# Patient Record
(demographics unavailable — no encounter records)

---

## 2024-10-18 NOTE — PROCEDURE
[No] : not [CRT-P] : Cardiac resynchronization therapy pacemaker [DDD] : DDD [Normal] : The battery status is normal. [Lead Imp:  ___ohms] : lead impedance was [unfilled] ohms [Sensing Amplitude ___mv] : sensing amplitude was [unfilled] mv [___V @] : [unfilled] V [___ ms] : [unfilled] ms [de-identified] : [facility] [de-identified] : U128 [de-identified] : [id number] [de-identified] : 02/24/2023 [de-identified] : 60 [de-identified] : 9.5 years [de-identified] : RV and LV outputs decreased [de-identified] : Multiple episodes of atrial flutter, longest episode >39 hours 9/11/24

## 2024-10-18 NOTE — END OF VISIT
[Time Spent: ___ minutes] : I have spent [unfilled] minutes of time on the encounter which excludes teaching and separately reported services. [FreeTextEntry3] :  All medical record entries made by my scribe were at my, Dr. Tung Galan, direction and personally dictated by me. I have reviewed the chart and agree that the record accurately reflects my personal performance of the history, physical exam, assessment and plan. I have also personally directed, reviewed, and agreed with the chart.

## 2024-10-18 NOTE — HISTORY OF PRESENT ILLNESS
[de-identified] : Cardio: Dr. Barraza EP: Dr. Galan  60 yo male with PMH prostate CA, GERD, HTN, atrial fib s/p cardioversion 2/1/23 on Xarelto s/p CRT-P placement for CHB on EKG.  Patient comes for further evaluation for atrial fibrillation. Patient complains of some weakness and tiredness.   Patient s/p AF ablation in 11/2023. Patient feels great, no palpitations. Device evaluation showed no atrial fibrillation since ablation.    04/19/2024: Patient is doing great. No signs of recurrence of atrial fibrillation since ablation. Device did not show any signs of atrial fibrillation.     10/18/2024: Patient comes to the office today for routine follow-up. He was found to have an increased AF burden. Last ablation was in 2023.       EKG (12/21/2023): Sinus rhythm

## 2024-10-18 NOTE — ADDENDUM
[FreeTextEntry1] : Lynn HARRSION assisted in documentation on 10/18/2024   acting as a scribe for Dr. Tung Galan.

## 2024-10-18 NOTE — CARDIOLOGY SUMMARY
[de-identified] : 3/23/2023: sinus rhythm, BIV-paced 72 bpm [de-identified] : 2/24/2023:  1. Mildly decreased global left ventricular systolic function. Left  ventricular ejection fraction, by visual estimation, is 45 to 50%.  2. The left ventricular diastolic function could not be assessed in this  study.  3. Normal right ventricular size and function.  4. Moderately enlarged left atrium.  5. Sclerotic aortic valve with normal opening.  6. Dilatation of the ascending aorta (4.0cm).  7. No evidence of left ventricular thrombus with echocontrast  administration.  8. No pericardial effusion.  9. No evidence of pulmonary hypertension. 10. NB: patient was not in sinus rhythm during this study; likely CHB.

## 2024-10-18 NOTE — ASSESSMENT
[FreeTextEntry1] : Atrial Fibrillation   - Continue Xarelto 20 mg once a day no bleeding.    - Decrease Metoprolol to 25 mg once a day.    - I discussed with patient the possibility of second ablation. Patient has some symptoms. Patient would like to think about it. Will return to the office in 01/2025 for further discussion about second ablation.    Complete Heart Block  - Normal functioning BiV-P pacemaker.     Hypertension  - Patient's pressure is well controlled.   - Continue Lisinopril 20 mg once a day.

## 2024-10-18 NOTE — PHYSICAL EXAM
[General Appearance - Well Developed] : well developed [Normal Appearance] : normal appearance [Well Groomed] : well groomed [General Appearance - Well Nourished] : well nourished [No Deformities] : no deformities [General Appearance - In No Acute Distress] : no acute distress [Heart Rate And Rhythm] : heart rate and rhythm were normal [Heart Sounds] : normal S1 and S2 [] : no respiratory distress [Respiration, Rhythm And Depth] : normal respiratory rhythm and effort [Left Infraclavicular] : left infraclavicular area [Clean] : clean [Dry] : dry [Well-Healed] : well-healed [Abdomen Soft] : soft [Nail Clubbing] : no clubbing of the fingernails [Cyanosis, Localized] : no localized cyanosis

## 2025-01-31 NOTE — ASSESSMENT
[FreeTextEntry1] : Atrial Fibrillation   - Continue Xarelto 20 mg once a day no bleeding.    - Decrease Metoprolol to 25 mg once a day.    - Patient showed increase in frequency and duration of his atrial fibrillation. Patient is in AF~55% of the time. - Discussed with patient different options. Patient would like to proceed with ablation.   - I have discussed different treatment options with THOE KEVIN  including anti-arrhythmic medications or ablation.  After a long discussion, the patient would like to proceed with an ablation.  I have explained the risks and benefits of the procedure to the patient.  There is approximately 1-2% chance of any major cardiovascular complication to occur. Complications include, but are not limited to infection, bleeding, damage to the vessels, hole in the heart, stroke, death and heart attack. There is also 1% risk of phrenic nerve injury.  The patient understands the risk and would like to proceed with the procedure. Patient had opportunity to ask questions. Patient indicated that all of his questions were answered to his satisfaction and verbalized understanding.  Complete Heart Block  - Normal functioning BiV-P pacemaker.    Hypertension  - Patient's pressure is well controlled.   - Continue Lisinopril 20 mg once a day.

## 2025-01-31 NOTE — CARDIOLOGY SUMMARY
[de-identified] : 3/23/2023: sinus rhythm, BIV-paced 72 bpm [de-identified] : 2/24/2023:  1. Mildly decreased global left ventricular systolic function. Left  ventricular ejection fraction, by visual estimation, is 45 to 50%.  2. The left ventricular diastolic function could not be assessed in this  study.  3. Normal right ventricular size and function.  4. Moderately enlarged left atrium.  5. Sclerotic aortic valve with normal opening.  6. Dilatation of the ascending aorta (4.0cm).  7. No evidence of left ventricular thrombus with echocontrast  administration.  8. No pericardial effusion.  9. No evidence of pulmonary hypertension. 10. NB: patient was not in sinus rhythm during this study; likely CHB.

## 2025-01-31 NOTE — PROCEDURE
[No] : not [CRT-P] : Cardiac resynchronization therapy pacemaker [DDD] : DDD [Normal] : The battery status is normal. [Lead Imp:  ___ohms] : lead impedance was [unfilled] ohms [Sensing Amplitude ___mv] : sensing amplitude was [unfilled] mv [___V @] : [unfilled] V [___ ms] : [unfilled] ms [None] : none [Magnet Rate: ___ Ppm] : magnet rate was [unfilled] Ppm [de-identified] : [facility] [de-identified] : U128 [de-identified] : [id number] [de-identified] : 02/24/2023 [de-identified] : 60 [de-identified] : 9.5 years [de-identified] : AP 7% BP 90% Multiple episodes of atrial flutter, longest episode >48 hours 12/17/24

## 2025-01-31 NOTE — HISTORY OF PRESENT ILLNESS
[de-identified] : Cardio: Dr. Barraza EP: Dr. Galan  62 yo male with PMH prostate CA, GERD, HTN, atrial fib s/p cardioversion 2/1/23 on Xarelto s/p CRT-P placement for CHB on EKG.  Patient comes for further evaluation for atrial fibrillation. Patient complains of some weakness and tiredness.   Patient s/p AF ablation in 11/2023. Patient feels great, no palpitations. Device evaluation showed no atrial fibrillation since ablation.    04/19/2024: Patient is doing great. No signs of recurrence of atrial fibrillation since ablation. Device did not show any signs of atrial fibrillation.     10/18/2024: Patient comes to the office today for routine follow-up. He was found to have an increased AF burden. Last ablation was in 2023.   01/31/2025: Patient comes to the office for routine follow-up of his pacemaker and to discuss possibility of ablation.       EKG (12/21/2023): Sinus rhythm

## 2025-01-31 NOTE — ADDENDUM
[FreeTextEntry1] : Lynn HARRISON assisted in documentation on 01/31/2025   acting as a scribe for Dr. Tung Galan.

## 2025-06-27 NOTE — PHYSICAL EXAM
[Normal Appearance] : normal appearance [General Appearance - Well Developed] : well developed [Well Groomed] : well groomed [General Appearance - Well Nourished] : well nourished [No Deformities] : no deformities [General Appearance - In No Acute Distress] : no acute distress [Heart Rate And Rhythm] : heart rate and rhythm were normal [Heart Sounds] : normal S1 and S2 [] : no respiratory distress [Respiration, Rhythm And Depth] : normal respiratory rhythm and effort [Left Infraclavicular] : left infraclavicular area [Clean] : clean [Dry] : dry [Well-Healed] : well-healed [Abdomen Soft] : soft [Nail Clubbing] : no clubbing of the fingernails [Cyanosis, Localized] : no localized cyanosis

## 2025-06-27 NOTE — HISTORY OF PRESENT ILLNESS
[de-identified] : Cardio: Dr. Barraza EP: Dr. Galan  62 yo male with PMH prostate CA, GERD, HTN, atrial fib s/p cardioversion 2/1/23 on Xarelto s/p CRT-P placement for CHB on EKG.  Patient comes for further evaluation for atrial fibrillation. Patient complains of some weakness and tiredness.   Patient s/p AF ablation in 11/2023. Patient feels great, no palpitations. Device evaluation showed no atrial fibrillation since ablation.    04/19/2024: Patient is doing great. No signs of recurrence of atrial fibrillation since ablation. Device did not show any signs of atrial fibrillation.     10/18/2024: Patient comes to the office today for routine follow-up. He was found to have an increased AF burden. Last ablation was in 2023.   01/31/2025: Patient comes to the office for routine follow-up of his pacemaker and to discuss possibility of ablation.

## 2025-06-27 NOTE — CARDIOLOGY SUMMARY
[de-identified] : 3/23/2023: sinus rhythm, BIV-paced 72 bpm 6/27/205 - 70 Atrial fib BI V paced  [de-identified] : 2/24/2023:  1. Mildly decreased global left ventricular systolic function. Left  ventricular ejection fraction, by visual estimation, is 45 to 50%.  2. The left ventricular diastolic function could not be assessed in this  study.  3. Normal right ventricular size and function.  4. Moderately enlarged left atrium.  5. Sclerotic aortic valve with normal opening.  6. Dilatation of the ascending aorta (4.0cm).  7. No evidence of left ventricular thrombus with echocontrast  administration.  8. No pericardial effusion.  9. No evidence of pulmonary hypertension. 10. NB: patient was not in sinus rhythm during this study; likely CHB. [de-identified] : 2/24/203 - Min CRT-P

## 2025-06-27 NOTE — PROCEDURE
[No] : not [Atrial Fibrillation] : atrial fibrillation [CRT-P] : Cardiac resynchronization therapy pacemaker [DDD] : DDD [Normal] : The battery status is normal. [Lead Imp:  ___ohms] : lead impedance was [unfilled] ohms [Sensing Amplitude ___mv] : sensing amplitude was [unfilled] mv [___V @] : [unfilled] V [___ ms] : [unfilled] ms [None] : none [de-identified] : [facility] [de-identified] : U128 [de-identified] : [id number] [de-identified] : 02/24/2023 [de-identified] : 60 [de-identified] : 7 Years [de-identified] : 52% of AF

## 2025-06-27 NOTE — ASSESSMENT
[FreeTextEntry1] : Atrial Fibrillation   - Continue Xarelto 20 mg once a day no bleeding.    - Decrease Metoprolol to 25 mg once a day.    - Patient showed increase in frequency and duration of his atrial fibrillation. Patient is in AF~55% of the time. - Discussed with patient different options. Patient would like to proceed with ablation.   - I have discussed different treatment options with THEO BROWN  including anti-arrhythmic medications or ablation.  After a long discussion, the patient would like to proceed with an ablation.  I have explained the risks and benefits of the procedure to the patient.  There is approximately 1-2% chance of any major cardiovascular complication to occur. Complications include, but are not limited to infection, bleeding, damage to the vessels, hole in the heart, stroke, death and heart attack. There is also 1% risk of phrenic nerve injury.  The patient understands the risk and would like to proceed with the procedure. Patient had opportunity to ask questions. Patient indicated that all of his questions were answered to his satisfaction and verbalized understanding.  Complete Heart Block  - Normal functioning BiV-P pacemaker.    Hypertension  - Patient's pressure is well controlled.   - Continue Lisinopril 20 mg once a day.   ------------------------------------------------------- 6/17/2025 Patient has an appointment for RF ablation already - was contemplating of cancelling it. But when he saw he is is Afib today - he wants to proceed Interrogation showed he is currently in Afib - burden is 59% Continue xarelto- denies any bleeding issue BP  140/90 - on lisnopril Patient to return after PFA ablation. I have also advised the patient to go to the nearest emergency room if he experiences any chest pain, dyspnea, syncope, or has any other compelling symptoms.